# Patient Record
Sex: MALE | Race: WHITE | NOT HISPANIC OR LATINO | Employment: FULL TIME | URBAN - METROPOLITAN AREA
[De-identification: names, ages, dates, MRNs, and addresses within clinical notes are randomized per-mention and may not be internally consistent; named-entity substitution may affect disease eponyms.]

---

## 2020-08-31 ENCOUNTER — HOSPITAL ENCOUNTER (OUTPATIENT)
Facility: HOSPITAL | Age: 60
Discharge: HOME | End: 2020-09-01
Attending: EMERGENCY MEDICINE | Admitting: INTERNAL MEDICINE

## 2020-08-31 ENCOUNTER — APPOINTMENT (OUTPATIENT)
Dept: GENERAL RADIOLOGY | Facility: HOSPITAL | Age: 60
End: 2020-08-31

## 2020-08-31 DIAGNOSIS — I24.9 ACUTE CORONARY SYNDROME (HCC): Primary | ICD-10-CM

## 2020-08-31 PROBLEM — I10 HTN (HYPERTENSION): Status: ACTIVE | Noted: 2020-08-31

## 2020-08-31 PROBLEM — F41.9 ANXIETY DISORDER: Status: ACTIVE | Noted: 2020-08-31

## 2020-08-31 PROBLEM — E78.5 HLD (HYPERLIPIDEMIA): Status: ACTIVE | Noted: 2020-08-31

## 2020-08-31 LAB
ALBUMIN SERPL-MCNC: 4.3 G/DL (ref 3.5–5.2)
ALBUMIN/GLOB SERPL: 2.2 G/DL
ALP SERPL-CCNC: 67 U/L (ref 39–117)
ALT SERPL W P-5'-P-CCNC: 20 U/L (ref 1–41)
ANION GAP SERPL CALCULATED.3IONS-SCNC: 12 MMOL/L (ref 5–15)
AST SERPL-CCNC: 20 U/L (ref 1–40)
BASOPHILS # BLD AUTO: 0.03 10*3/MM3 (ref 0–0.2)
BASOPHILS NFR BLD AUTO: 0.4 % (ref 0–1.5)
BILIRUB SERPL-MCNC: 1 MG/DL (ref 0–1.2)
BILIRUB UR QL STRIP: NEGATIVE
BUN SERPL-MCNC: 20 MG/DL (ref 6–20)
BUN/CREAT SERPL: 17.4 (ref 7–25)
CALCIUM SPEC-SCNC: 9.2 MG/DL (ref 8.6–10.5)
CHLORIDE SERPL-SCNC: 107 MMOL/L (ref 98–107)
CLARITY UR: CLEAR
CO2 SERPL-SCNC: 24 MMOL/L (ref 22–29)
COLOR UR: YELLOW
CREAT SERPL-MCNC: 1.15 MG/DL (ref 0.76–1.27)
DEPRECATED RDW RBC AUTO: 40.4 FL (ref 37–54)
EOSINOPHIL # BLD AUTO: 0.08 10*3/MM3 (ref 0–0.4)
EOSINOPHIL NFR BLD AUTO: 1 % (ref 0.3–6.2)
ERYTHROCYTE [DISTWIDTH] IN BLOOD BY AUTOMATED COUNT: 12 % (ref 12.3–15.4)
GFR SERPL CREATININE-BSD FRML MDRD: 65 ML/MIN/1.73
GLOBULIN UR ELPH-MCNC: 2 GM/DL
GLUCOSE SERPL-MCNC: 184 MG/DL (ref 65–99)
GLUCOSE UR STRIP-MCNC: NEGATIVE MG/DL
HCT VFR BLD AUTO: 43.1 % (ref 37.5–51)
HGB BLD-MCNC: 14.6 G/DL (ref 13–17.7)
HGB UR QL STRIP.AUTO: NEGATIVE
HOLD SPECIMEN: NORMAL
HOLD SPECIMEN: NORMAL
IMM GRANULOCYTES # BLD AUTO: 0.03 10*3/MM3 (ref 0–0.05)
IMM GRANULOCYTES NFR BLD AUTO: 0.4 % (ref 0–0.5)
KETONES UR QL STRIP: NEGATIVE
LEUKOCYTE ESTERASE UR QL STRIP.AUTO: NEGATIVE
LIPASE SERPL-CCNC: 33 U/L (ref 13–60)
LYMPHOCYTES # BLD AUTO: 1.62 10*3/MM3 (ref 0.7–3.1)
LYMPHOCYTES NFR BLD AUTO: 19.3 % (ref 19.6–45.3)
MCH RBC QN AUTO: 31.1 PG (ref 26.6–33)
MCHC RBC AUTO-ENTMCNC: 33.9 G/DL (ref 31.5–35.7)
MCV RBC AUTO: 91.7 FL (ref 79–97)
MONOCYTES # BLD AUTO: 0.5 10*3/MM3 (ref 0.1–0.9)
MONOCYTES NFR BLD AUTO: 6 % (ref 5–12)
NEUTROPHILS NFR BLD AUTO: 6.12 10*3/MM3 (ref 1.7–7)
NEUTROPHILS NFR BLD AUTO: 72.9 % (ref 42.7–76)
NITRITE UR QL STRIP: NEGATIVE
NRBC BLD AUTO-RTO: 0 /100 WBC (ref 0–0.2)
NT-PROBNP SERPL-MCNC: 25 PG/ML (ref 0–900)
PH UR STRIP.AUTO: <=5 [PH] (ref 5–8)
PLATELET # BLD AUTO: 146 10*3/MM3 (ref 140–450)
PMV BLD AUTO: 11.9 FL (ref 6–12)
POTASSIUM SERPL-SCNC: 3.6 MMOL/L (ref 3.5–5.2)
PROT SERPL-MCNC: 6.3 G/DL (ref 6–8.5)
PROT UR QL STRIP: NEGATIVE
RBC # BLD AUTO: 4.7 10*6/MM3 (ref 4.14–5.8)
SODIUM SERPL-SCNC: 143 MMOL/L (ref 136–145)
SP GR UR STRIP: 1.02 (ref 1–1.03)
TROPONIN T SERPL-MCNC: <0.01 NG/ML (ref 0–0.03)
TROPONIN T SERPL-MCNC: <0.01 NG/ML (ref 0–0.03)
UROBILINOGEN UR QL STRIP: NORMAL
WBC # BLD AUTO: 8.38 10*3/MM3 (ref 3.4–10.8)
WHOLE BLOOD HOLD SPECIMEN: NORMAL
WHOLE BLOOD HOLD SPECIMEN: NORMAL

## 2020-08-31 PROCEDURE — 93005 ELECTROCARDIOGRAM TRACING: CPT | Performed by: EMERGENCY MEDICINE

## 2020-08-31 PROCEDURE — 96372 THER/PROPH/DIAG INJ SC/IM: CPT

## 2020-08-31 PROCEDURE — 84484 ASSAY OF TROPONIN QUANT: CPT | Performed by: INTERNAL MEDICINE

## 2020-08-31 PROCEDURE — 25010000002 ENOXAPARIN PER 10 MG: Performed by: EMERGENCY MEDICINE

## 2020-08-31 PROCEDURE — 93005 ELECTROCARDIOGRAM TRACING: CPT | Performed by: INTERNAL MEDICINE

## 2020-08-31 PROCEDURE — 81003 URINALYSIS AUTO W/O SCOPE: CPT | Performed by: NURSE PRACTITIONER

## 2020-08-31 PROCEDURE — 93010 ELECTROCARDIOGRAM REPORT: CPT | Performed by: INTERNAL MEDICINE

## 2020-08-31 PROCEDURE — G0378 HOSPITAL OBSERVATION PER HR: HCPCS

## 2020-08-31 PROCEDURE — 71045 X-RAY EXAM CHEST 1 VIEW: CPT

## 2020-08-31 PROCEDURE — 83880 ASSAY OF NATRIURETIC PEPTIDE: CPT

## 2020-08-31 PROCEDURE — 85025 COMPLETE CBC W/AUTO DIFF WBC: CPT

## 2020-08-31 PROCEDURE — 80053 COMPREHEN METABOLIC PANEL: CPT

## 2020-08-31 PROCEDURE — 99220 PR INITIAL OBSERVATION CARE/DAY 70 MINUTES: CPT | Performed by: NURSE PRACTITIONER

## 2020-08-31 PROCEDURE — 84484 ASSAY OF TROPONIN QUANT: CPT

## 2020-08-31 PROCEDURE — 93005 ELECTROCARDIOGRAM TRACING: CPT

## 2020-08-31 PROCEDURE — 99284 EMERGENCY DEPT VISIT MOD MDM: CPT

## 2020-08-31 PROCEDURE — 83690 ASSAY OF LIPASE: CPT

## 2020-08-31 RX ORDER — ASPIRIN 81 MG/1
81 TABLET, CHEWABLE ORAL DAILY
Status: DISCONTINUED | OUTPATIENT
Start: 2020-09-01 | End: 2020-09-01 | Stop reason: HOSPADM

## 2020-08-31 RX ORDER — DIAZEPAM 5 MG/1
5 TABLET ORAL 3 TIMES DAILY
Status: DISCONTINUED | OUTPATIENT
Start: 2020-08-31 | End: 2020-09-01 | Stop reason: HOSPADM

## 2020-08-31 RX ORDER — ASCORBIC ACID 500 MG
500 TABLET ORAL DAILY
COMMUNITY

## 2020-08-31 RX ORDER — LISINOPRIL 10 MG/1
10 TABLET ORAL
COMMUNITY

## 2020-08-31 RX ORDER — CHOLECALCIFEROL (VITAMIN D3) 125 MCG
5 CAPSULE ORAL NIGHTLY PRN
Status: DISCONTINUED | OUTPATIENT
Start: 2020-08-31 | End: 2020-09-01 | Stop reason: HOSPADM

## 2020-08-31 RX ORDER — ATORVASTATIN CALCIUM 10 MG/1
10 TABLET, FILM COATED ORAL NIGHTLY
COMMUNITY

## 2020-08-31 RX ORDER — ASCORBIC ACID 500 MG
500 TABLET ORAL DAILY
Status: DISCONTINUED | OUTPATIENT
Start: 2020-09-01 | End: 2020-09-01 | Stop reason: HOSPADM

## 2020-08-31 RX ORDER — ASPIRIN 81 MG/1
324 TABLET, CHEWABLE ORAL ONCE
Status: DISCONTINUED | OUTPATIENT
Start: 2020-08-31 | End: 2020-08-31

## 2020-08-31 RX ORDER — SODIUM CHLORIDE 0.9 % (FLUSH) 0.9 %
10 SYRINGE (ML) INJECTION AS NEEDED
Status: DISCONTINUED | OUTPATIENT
Start: 2020-08-31 | End: 2020-09-01 | Stop reason: HOSPADM

## 2020-08-31 RX ORDER — ACETAMINOPHEN 650 MG/1
650 SUPPOSITORY RECTAL EVERY 4 HOURS PRN
Status: DISCONTINUED | OUTPATIENT
Start: 2020-08-31 | End: 2020-09-01 | Stop reason: HOSPADM

## 2020-08-31 RX ORDER — LISINOPRIL 10 MG/1
10 TABLET ORAL
Status: DISCONTINUED | OUTPATIENT
Start: 2020-09-01 | End: 2020-09-01 | Stop reason: HOSPADM

## 2020-08-31 RX ORDER — L.ACID,PARA/B.BIFIDUM/S.THERM 8B CELL
1 CAPSULE ORAL DAILY
Status: DISCONTINUED | OUTPATIENT
Start: 2020-09-01 | End: 2020-09-01 | Stop reason: HOSPADM

## 2020-08-31 RX ORDER — ATORVASTATIN CALCIUM 10 MG/1
10 TABLET, FILM COATED ORAL NIGHTLY
Status: DISCONTINUED | OUTPATIENT
Start: 2020-09-01 | End: 2020-09-01 | Stop reason: HOSPADM

## 2020-08-31 RX ORDER — DIAZEPAM 5 MG/1
5 TABLET ORAL 3 TIMES DAILY
COMMUNITY

## 2020-08-31 RX ORDER — ACETAMINOPHEN 325 MG/1
650 TABLET ORAL EVERY 4 HOURS PRN
Status: DISCONTINUED | OUTPATIENT
Start: 2020-08-31 | End: 2020-09-01 | Stop reason: HOSPADM

## 2020-08-31 RX ORDER — OMEGA-3S/DHA/EPA/FISH OIL/D3 300MG-1000
400 CAPSULE ORAL DAILY
COMMUNITY

## 2020-08-31 RX ORDER — OMEGA-3S/DHA/EPA/FISH OIL/D3 300MG-1000
400 CAPSULE ORAL DAILY
Status: DISCONTINUED | OUTPATIENT
Start: 2020-09-01 | End: 2020-09-01 | Stop reason: HOSPADM

## 2020-08-31 RX ORDER — ONDANSETRON 2 MG/ML
4 INJECTION INTRAMUSCULAR; INTRAVENOUS EVERY 6 HOURS PRN
Status: DISCONTINUED | OUTPATIENT
Start: 2020-08-31 | End: 2020-09-01 | Stop reason: HOSPADM

## 2020-08-31 RX ORDER — ASPIRIN 81 MG/1
81 TABLET, CHEWABLE ORAL DAILY
COMMUNITY

## 2020-08-31 RX ORDER — ACETAMINOPHEN 160 MG/5ML
650 SOLUTION ORAL EVERY 4 HOURS PRN
Status: DISCONTINUED | OUTPATIENT
Start: 2020-08-31 | End: 2020-09-01 | Stop reason: HOSPADM

## 2020-08-31 RX ORDER — SODIUM CHLORIDE 0.9 % (FLUSH) 0.9 %
10 SYRINGE (ML) INJECTION EVERY 12 HOURS SCHEDULED
Status: DISCONTINUED | OUTPATIENT
Start: 2020-08-31 | End: 2020-09-01 | Stop reason: HOSPADM

## 2020-08-31 RX ORDER — PHENAZOPYRIDINE HYDROCHLORIDE 95 MG/1
TABLET ORAL
COMMUNITY
End: 2020-08-31

## 2020-08-31 RX ORDER — ONDANSETRON 4 MG/1
4 TABLET, FILM COATED ORAL EVERY 6 HOURS PRN
Status: DISCONTINUED | OUTPATIENT
Start: 2020-08-31 | End: 2020-09-01 | Stop reason: HOSPADM

## 2020-08-31 RX ADMIN — ENOXAPARIN SODIUM 80 MG: 80 INJECTION SUBCUTANEOUS at 20:48

## 2020-08-31 RX ADMIN — SODIUM CHLORIDE, PRESERVATIVE FREE 10 ML: 5 INJECTION INTRAVENOUS at 22:51

## 2020-08-31 RX ADMIN — DIAZEPAM 5 MG: 5 TABLET ORAL at 22:49

## 2020-09-01 VITALS
HEIGHT: 70 IN | OXYGEN SATURATION: 97 % | WEIGHT: 180 LBS | TEMPERATURE: 98.3 F | BODY MASS INDEX: 25.77 KG/M2 | DIASTOLIC BLOOD PRESSURE: 78 MMHG | HEART RATE: 76 BPM | RESPIRATION RATE: 16 BRPM | SYSTOLIC BLOOD PRESSURE: 112 MMHG

## 2020-09-01 LAB
ANION GAP SERPL CALCULATED.3IONS-SCNC: 8 MMOL/L (ref 5–15)
BASOPHILS # BLD AUTO: 0.02 10*3/MM3 (ref 0–0.2)
BASOPHILS NFR BLD AUTO: 0.3 % (ref 0–1.5)
BUN SERPL-MCNC: 18 MG/DL (ref 6–20)
BUN/CREAT SERPL: 21.2 (ref 7–25)
CALCIUM SPEC-SCNC: 8.9 MG/DL (ref 8.6–10.5)
CHLORIDE SERPL-SCNC: 107 MMOL/L (ref 98–107)
CHOLEST SERPL-MCNC: 110 MG/DL (ref 0–200)
CO2 SERPL-SCNC: 27 MMOL/L (ref 22–29)
CREAT SERPL-MCNC: 0.85 MG/DL (ref 0.76–1.27)
DEPRECATED RDW RBC AUTO: 41.1 FL (ref 37–54)
EOSINOPHIL # BLD AUTO: 0.06 10*3/MM3 (ref 0–0.4)
EOSINOPHIL NFR BLD AUTO: 0.8 % (ref 0.3–6.2)
ERYTHROCYTE [DISTWIDTH] IN BLOOD BY AUTOMATED COUNT: 12.1 % (ref 12.3–15.4)
GFR SERPL CREATININE-BSD FRML MDRD: 93 ML/MIN/1.73
GLUCOSE SERPL-MCNC: 100 MG/DL (ref 65–99)
HBA1C MFR BLD: 5.5 % (ref 4.8–5.6)
HCT VFR BLD AUTO: 41.9 % (ref 37.5–51)
HDLC SERPL-MCNC: 36 MG/DL (ref 40–60)
HGB BLD-MCNC: 14 G/DL (ref 13–17.7)
IMM GRANULOCYTES # BLD AUTO: 0.01 10*3/MM3 (ref 0–0.05)
IMM GRANULOCYTES NFR BLD AUTO: 0.1 % (ref 0–0.5)
LDLC SERPL CALC-MCNC: 55 MG/DL (ref 0–100)
LDLC/HDLC SERPL: 1.52 {RATIO}
LYMPHOCYTES # BLD AUTO: 1.99 10*3/MM3 (ref 0.7–3.1)
LYMPHOCYTES NFR BLD AUTO: 28.2 % (ref 19.6–45.3)
MAGNESIUM SERPL-MCNC: 2 MG/DL (ref 1.6–2.6)
MCH RBC QN AUTO: 30.9 PG (ref 26.6–33)
MCHC RBC AUTO-ENTMCNC: 33.4 G/DL (ref 31.5–35.7)
MCV RBC AUTO: 92.5 FL (ref 79–97)
MONOCYTES # BLD AUTO: 0.63 10*3/MM3 (ref 0.1–0.9)
MONOCYTES NFR BLD AUTO: 8.9 % (ref 5–12)
NEUTROPHILS NFR BLD AUTO: 4.35 10*3/MM3 (ref 1.7–7)
NEUTROPHILS NFR BLD AUTO: 61.7 % (ref 42.7–76)
NRBC BLD AUTO-RTO: 0 /100 WBC (ref 0–0.2)
PLATELET # BLD AUTO: 136 10*3/MM3 (ref 140–450)
PMV BLD AUTO: 12.2 FL (ref 6–12)
POTASSIUM SERPL-SCNC: 3.4 MMOL/L (ref 3.5–5.2)
RBC # BLD AUTO: 4.53 10*6/MM3 (ref 4.14–5.8)
SARS-COV-2 RDRP RESP QL NAA+PROBE: NOT DETECTED
SODIUM SERPL-SCNC: 142 MMOL/L (ref 136–145)
TRIGL SERPL-MCNC: 97 MG/DL (ref 0–150)
TROPONIN T SERPL-MCNC: <0.01 NG/ML (ref 0–0.03)
TROPONIN T SERPL-MCNC: <0.01 NG/ML (ref 0–0.03)
TSH SERPL DL<=0.05 MIU/L-ACNC: 2.28 UIU/ML (ref 0.27–4.2)
VLDLC SERPL-MCNC: 19.4 MG/DL
WBC # BLD AUTO: 7.06 10*3/MM3 (ref 3.4–10.8)

## 2020-09-01 PROCEDURE — 25010000002 FENTANYL CITRATE (PF) 100 MCG/2ML SOLUTION: Performed by: INTERNAL MEDICINE

## 2020-09-01 PROCEDURE — 25010000002 HEPARIN (PORCINE) PER 1000 UNITS: Performed by: INTERNAL MEDICINE

## 2020-09-01 PROCEDURE — 83735 ASSAY OF MAGNESIUM: CPT | Performed by: INTERNAL MEDICINE

## 2020-09-01 PROCEDURE — G0378 HOSPITAL OBSERVATION PER HR: HCPCS

## 2020-09-01 PROCEDURE — 80048 BASIC METABOLIC PNL TOTAL CA: CPT | Performed by: INTERNAL MEDICINE

## 2020-09-01 PROCEDURE — 87635 SARS-COV-2 COVID-19 AMP PRB: CPT | Performed by: NURSE PRACTITIONER

## 2020-09-01 PROCEDURE — 83036 HEMOGLOBIN GLYCOSYLATED A1C: CPT | Performed by: INTERNAL MEDICINE

## 2020-09-01 PROCEDURE — 0 IOPAMIDOL PER 1 ML: Performed by: INTERNAL MEDICINE

## 2020-09-01 PROCEDURE — 93005 ELECTROCARDIOGRAM TRACING: CPT | Performed by: INTERNAL MEDICINE

## 2020-09-01 PROCEDURE — 84484 ASSAY OF TROPONIN QUANT: CPT | Performed by: INTERNAL MEDICINE

## 2020-09-01 PROCEDURE — 85025 COMPLETE CBC W/AUTO DIFF WBC: CPT | Performed by: INTERNAL MEDICINE

## 2020-09-01 PROCEDURE — C1894 INTRO/SHEATH, NON-LASER: HCPCS | Performed by: INTERNAL MEDICINE

## 2020-09-01 PROCEDURE — 93458 L HRT ARTERY/VENTRICLE ANGIO: CPT | Performed by: INTERNAL MEDICINE

## 2020-09-01 PROCEDURE — 80061 LIPID PANEL: CPT | Performed by: INTERNAL MEDICINE

## 2020-09-01 PROCEDURE — 99217 PR OBSERVATION CARE DISCHARGE MANAGEMENT: CPT | Performed by: INTERNAL MEDICINE

## 2020-09-01 PROCEDURE — 25010000003 LIDOCAINE 1 % SOLUTION: Performed by: INTERNAL MEDICINE

## 2020-09-01 PROCEDURE — 84443 ASSAY THYROID STIM HORMONE: CPT | Performed by: INTERNAL MEDICINE

## 2020-09-01 PROCEDURE — C1769 GUIDE WIRE: HCPCS | Performed by: INTERNAL MEDICINE

## 2020-09-01 PROCEDURE — 25010000002 MIDAZOLAM PER 1 MG: Performed by: INTERNAL MEDICINE

## 2020-09-01 RX ORDER — LIDOCAINE HYDROCHLORIDE 10 MG/ML
INJECTION, SOLUTION INFILTRATION; PERINEURAL AS NEEDED
Status: DISCONTINUED | OUTPATIENT
Start: 2020-09-01 | End: 2020-09-01 | Stop reason: HOSPADM

## 2020-09-01 RX ORDER — SODIUM CHLORIDE 9 MG/ML
250 INJECTION, SOLUTION INTRAVENOUS CONTINUOUS
Status: ACTIVE | OUTPATIENT
Start: 2020-09-01 | End: 2020-09-01

## 2020-09-01 RX ORDER — ALPRAZOLAM 0.25 MG/1
0.25 TABLET ORAL 3 TIMES DAILY PRN
Status: DISCONTINUED | OUTPATIENT
Start: 2020-09-01 | End: 2020-09-01 | Stop reason: HOSPADM

## 2020-09-01 RX ORDER — POTASSIUM CHLORIDE 750 MG/1
40 CAPSULE, EXTENDED RELEASE ORAL AS NEEDED
Status: DISCONTINUED | OUTPATIENT
Start: 2020-09-01 | End: 2020-09-01 | Stop reason: HOSPADM

## 2020-09-01 RX ORDER — MIDAZOLAM HYDROCHLORIDE 1 MG/ML
INJECTION INTRAMUSCULAR; INTRAVENOUS AS NEEDED
Status: DISCONTINUED | OUTPATIENT
Start: 2020-09-01 | End: 2020-09-01 | Stop reason: HOSPADM

## 2020-09-01 RX ORDER — ACETAMINOPHEN 325 MG/1
650 TABLET ORAL EVERY 4 HOURS PRN
Status: DISCONTINUED | OUTPATIENT
Start: 2020-09-01 | End: 2020-09-01 | Stop reason: HOSPADM

## 2020-09-01 RX ORDER — TEMAZEPAM 7.5 MG/1
7.5 CAPSULE ORAL NIGHTLY PRN
Status: DISCONTINUED | OUTPATIENT
Start: 2020-09-01 | End: 2020-09-01 | Stop reason: HOSPADM

## 2020-09-01 RX ORDER — POTASSIUM CHLORIDE 1.5 G/1.77G
40 POWDER, FOR SOLUTION ORAL AS NEEDED
Status: DISCONTINUED | OUTPATIENT
Start: 2020-09-01 | End: 2020-09-01 | Stop reason: HOSPADM

## 2020-09-01 RX ORDER — FENTANYL CITRATE 50 UG/ML
INJECTION, SOLUTION INTRAMUSCULAR; INTRAVENOUS AS NEEDED
Status: DISCONTINUED | OUTPATIENT
Start: 2020-09-01 | End: 2020-09-01 | Stop reason: HOSPADM

## 2020-09-01 RX ORDER — HYDROCODONE BITARTRATE AND ACETAMINOPHEN 5; 325 MG/1; MG/1
1 TABLET ORAL EVERY 4 HOURS PRN
Status: DISCONTINUED | OUTPATIENT
Start: 2020-09-01 | End: 2020-09-01 | Stop reason: HOSPADM

## 2020-09-01 RX ORDER — POTASSIUM CHLORIDE 7.45 MG/ML
10 INJECTION INTRAVENOUS
Status: DISCONTINUED | OUTPATIENT
Start: 2020-09-01 | End: 2020-09-01 | Stop reason: HOSPADM

## 2020-09-01 RX ADMIN — SODIUM CHLORIDE, PRESERVATIVE FREE 10 ML: 5 INJECTION INTRAVENOUS at 08:19

## 2020-09-01 RX ADMIN — CHOLECALCIFEROL (VITAMIN D3) 10 MCG (400 UNIT) TABLET 400 UNITS: at 08:15

## 2020-09-01 RX ADMIN — ATORVASTATIN CALCIUM 10 MG: 10 TABLET, FILM COATED ORAL at 08:15

## 2020-09-01 RX ADMIN — OXYCODONE HYDROCHLORIDE AND ACETAMINOPHEN 500 MG: 500 TABLET ORAL at 08:14

## 2020-09-01 RX ADMIN — DIAZEPAM 5 MG: 5 TABLET ORAL at 08:14

## 2020-09-01 RX ADMIN — ASPIRIN 81 MG CHEWABLE TABLET 81 MG: 81 TABLET CHEWABLE at 08:15

## 2020-09-01 RX ADMIN — LISINOPRIL 10 MG: 10 TABLET ORAL at 08:16

## 2020-09-01 RX ADMIN — PSYLLIUM HUSK 1 PACKET: 3.4 POWDER ORAL at 08:16

## 2020-09-01 RX ADMIN — Medication 1 CAPSULE: at 08:15

## 2020-09-01 RX ADMIN — POTASSIUM CHLORIDE 40 MEQ: 10 CAPSULE, COATED, EXTENDED RELEASE ORAL at 08:39

## 2020-09-01 RX ADMIN — DIAZEPAM 5 MG: 5 TABLET ORAL at 16:31

## 2020-09-01 RX ADMIN — POTASSIUM CHLORIDE 40 MEQ: 10 CAPSULE, COATED, EXTENDED RELEASE ORAL at 13:16

## 2020-09-01 NOTE — DISCHARGE SUMMARY
[facility] Services  Clinical Decision Unit  DISCHARGE SUMMARY    Patient Name: Rolando Frias  : 1960  MRN: [id number]    Admission Date and Time: 2020  7:26 PM  Discharge Date and Time:  20    Primary Care Physician: Wilman Jin MD    Hospital Course     Active Hospital Problems    Diagnosis  POA   • Acute coronary syndrome (CMS/HCC) [I24.9]  Yes   • HTN (hypertension) [I10]  Yes   • HLD (hyperlipidemia) [E78.5]  Yes   • Anxiety disorder [F41.9]  Yes      Resolved Hospital Problems   No resolved problems to display.          Brief CDU Course:  Rolando Frias is a 60 y.o. male with PMH of HTN, HLD, anxiety who was brought to the ED due to onset of chest pain while playing tennis with Dr. Martinez.     Plan:     Unstable Angina  --EKG unremarkable with no ST changes, troponins negative x 3  --s/p LMWH load, continue ASA/statin  --LHC was unremarkable with no coronary artery disease noted,  preprocedure COVID NEGATIVE  --LDL at goal at 55     HTN  --continue ACEI     HLD  --LDL at goal at 55  --continue statin      Key Discharge Recommendations:  1. Follow up with PCP within one week   Discharge Evaluation     Vital Signs:   Temp:  [97.8 °F (36.6 °C)-98.3 °F (36.8 °C)] 98.3 °F (36.8 °C)  Heart Rate:  [] 69  Resp:  [16-18] 16  BP: (108-167)/(74-89) 108/80     Physical Exam:  Constitutional: No acute distress, awake, alert  HENT: NCAT, mucous membranes moist  Respiratory: Clear to auscultation bilaterally, respiratory effort normal   Cardiovascular: RRR, no murmurs, rubs, or gallops, palpable pedal pulses bilaterally  Gastrointestinal: Positive bowel sounds, soft, nontender, nondistended  Musculoskeletal: No bilateral ankle edema  Psychiatric: Appropriate affect, cooperative  Neurologic: Oriented x 3, strength symmetric in all extremities, Cranial Nerves grossly intact to confrontation, speech clear  Skin: No rashes    Pertinent  and/or Most  Recent Results     Results from last 7 days   Lab Units 09/01/20  0401 08/31/20  1932   WBC 10*3/mm3 7.06 8.38   HEMOGLOBIN g/dL 14.0 14.6   HEMATOCRIT % 41.9 43.1   PLATELETS 10*3/mm3 136* 146   SODIUM mmol/L 142 143   POTASSIUM mmol/L 3.4* 3.6   CHLORIDE mmol/L 107 107   CO2 mmol/L 27.0 24.0   BUN mg/dL 18 20   CREATININE mg/dL 0.85 1.15   GLUCOSE mg/dL 100* 184*   CALCIUM mg/dL 8.9 9.2     Results from last 7 days   Lab Units 08/31/20  1932   BILIRUBIN mg/dL 1.0   ALK PHOS U/L 67   ALT (SGPT) U/L 20   AST (SGOT) U/L 20     Results from last 7 days   Lab Units 09/01/20  0401   CHOLESTEROL mg/dL 110   TRIGLYCERIDES mg/dL 97   HDL CHOL mg/dL 36*     Results from last 7 days   Lab Units 09/01/20  1113 09/01/20  0401 08/31/20  2209 08/31/20  1932   TSH uIU/mL  --  2.280  --   --    HEMOGLOBIN A1C %  --  5.50  --   --    PROBNP pg/mL  --   --   --  25.0   TROPONIN T ng/mL <0.010 <0.010 <0.010 <0.010       Brief Urine Lab Results  (Last result in the past 365 days)      Color   Clarity   Blood   Leuk Est   Nitrite   Protein   CREAT   Urine HCG        08/31/20 2217 Yellow Clear Negative Negative Negative Negative               Microbiology Results Abnormal     Procedure Component Value - Date/Time    COVID PRE-OP / PRE-PROCEDURE SCREENING ORDER (NO ISOLATION) - Swab, Nasal Cavity [[id number]] Collected:  09/01/20 0243    Lab Status:  Final result Specimen:  Swab from Nasal Cavity Updated:  09/01/20 0319    Narrative:       The following orders were created for panel order COVID PRE-OP / PRE-PROCEDURE SCREENING ORDER (NO ISOLATION) - Swab, Nasal Cavity.  Procedure                               Abnormality         Status                     ---------                               -----------         ------                     COVID-Dat ABBOTT IN-HOUS...[[id number]]  Normal              Final result                 Please view results for these tests on the individual orders.    COVID-Dat ABBOTT IN-HOUSE,NP Swab (NO TRANSPORT  MEDIA) 2 HR TAT - Swab, Nasal Cavity [[id number]]  (Normal) Collected:  09/01/20 0243    Lab Status:  Final result Specimen:  Swab from Nasal Cavity Updated:  09/01/20 0319     COVID19 Not Detected    Narrative:       Fact sheet for providers: https://www.fda.gov/media/761059/download     Fact sheet for patients: https://www.fda.gov/media/821462/download          Imaging Results (All)     Procedure Component Value Units Date/Time    XR Chest 1 View [[id number]] Collected:  08/31/20 1958     Updated:  08/31/20 2000    Narrative:       CR Chest 1 Vw    INDICATION:   60-year-old emergency department patient complaining of midsternal chest pain today     COMPARISON:    None available.    FINDINGS:  Single portable AP view(s) of the chest.  The heart and mediastinal contours are normal. The lungs are clear. No pneumothorax or pleural effusion.      Impression:       No acute cardiopulmonary findings.    Signer Name: Nel Moss MD   Signed: 8/31/2020 7:58 PM   Workstation Name: [id number]    [facility]                           Plan for Follow-up of Pending Labs/Results: *    Discharge Medications and Follow-Up        Discharge Medications      Continue These Medications      Instructions Start Date   aspirin 81 MG chewable tablet   81 mg, Oral, Daily      atorvastatin 10 MG tablet  Commonly known as:  LIPITOR   10 mg, Oral, Nightly      cholecalciferol 10 MCG (400 UNIT) tablet  Commonly known as:  VITAMIN D3   400 Units, Oral, Daily      diazePAM 5 MG tablet  Commonly known as:  VALIUM   5 mg, Oral, 3 Times Daily      JUICE PLUS FIBRE PO   2 capsules, Oral, Daily      lisinopril 10 MG tablet  Commonly known as:  PRINIVIL,ZESTRIL   10 mg, Oral      PROBIOTIC PO   1 capsule, Oral, Daily      psyllium 58.6 % packet  Commonly known as:  METAMUCIL   1 packet, Oral, Daily      vitamin C 500 MG tablet  Commonly known as:  ASCORBIC ACID   500 mg, Oral, Daily               No future  appointments.    Additional Instructions for the Follow-ups that You Need to Schedule     Discharge Follow-up with PCP   As directed       Currently Documented PCP:    Wilman Jin MD    PCP Phone Number:    [phone]     Follow Up Details:  1 week; call your family doctor in the morning to schedule an appointment         Discharge Follow-up with Specified Provider: Dr. Martinez; as needed   As directed      To:  Dr. Martinez; as needed             Electronically signed by Kelli Lucero MD, 09/02/20, 12:56 PM.      Time Spent on Discharge: I spent  35 minutes on this discharge activity which included: face-to-face encounter with the patient, reviewing the data in the system, coordination of the care with the nursing staff as well as consultants, documentation, and entering orders.

## 2020-09-01 NOTE — ED PROVIDER NOTES
"Subjective   Pt presents with chest pain.  He was playing tennis PTA with Dr Martinez and he developed left sided chest pain, palpitations and dyspnea.  These improved with rest and he feels better now.  Dr Martinez brought him in for evaluation.    2 days ago pt had headache, cough, chest pain and dyspnea.  Those resolved without intervention.  Felt fine yesterday.  No known CAD.          Review of Systems   Constitutional: Negative for fever.   HENT: Negative for sore throat.    Respiratory: Positive for cough and shortness of breath.    Cardiovascular: Positive for chest pain.   Neurological: Positive for headaches.   All other systems reviewed and are negative.      Past Medical History:   Diagnosis Date   • Anxiety disorder 2010   • Hyperlipidemia 2016   • Hypertension 1995       No Known Allergies    Past Surgical History:   Procedure Laterality Date   • CARDIOVASCULAR STRESS TEST  2002    w/ ECHO, was told he has a \"leaky valve\"   • LEG SURGERY Left     for varicose veins       Family History   Problem Relation Age of Onset   • Diabetes Mother    • Coronary artery disease Father    • Lung cancer Father        Social History     Socioeconomic History   • Marital status:      Spouse name: Not on file   • Number of children: Not on file   • Years of education: Not on file   • Highest education level: Not on file   Tobacco Use   • Smoking status: Never Smoker   Substance and Sexual Activity   • Alcohol use: Yes     Comment: OCCASIONALLY   • Drug use: Never   • Sexual activity: Defer           Objective   Physical Exam   Constitutional: He appears well-developed and well-nourished. He is cooperative.  Non-toxic appearance. No distress.   HENT:   Head: Normocephalic and atraumatic.   Mouth/Throat: Oropharynx is clear and moist and mucous membranes are normal.   Eyes: Conjunctivae and EOM are normal. No scleral icterus.   Neck: Normal range of motion. Neck supple.   Cardiovascular: Normal rate, regular rhythm " and normal heart sounds.   No murmur heard.  Pulmonary/Chest: Effort normal and breath sounds normal. No respiratory distress. He has no wheezes. He has no rhonchi. He has no rales.   Abdominal: Soft. Bowel sounds are normal. There is no tenderness. There is no rebound and no guarding.   Musculoskeletal: Normal range of motion.   Neurological: He is alert. He has normal strength.   Skin: Skin is warm and dry. No rash noted.   Psychiatric: He has a normal mood and affect. His speech is normal and behavior is normal.   Nursing note and vitals reviewed.      Procedures           ED Course         EKG NSR.  CXR negative. Labs benign.  Martinez plans cath tomorrow.  Lovenox ordered, pt admitted.                                  MDM  Number of Diagnoses or Management Options  Acute coronary syndrome (CMS/HCC):      Amount and/or Complexity of Data Reviewed  Clinical lab tests: reviewed and ordered  Tests in the radiology section of CPT®: reviewed and ordered  Decide to obtain previous medical records or to obtain history from someone other than the patient: yes  Obtain history from someone other than the patient: yes  Discuss the patient with other providers: yes  Independent visualization of images, tracings, or specimens: yes        Final diagnoses:   Acute coronary syndrome (CMS/HCC)            Gary Hanson MD  08/31/20 3691

## 2020-09-01 NOTE — PROGRESS NOTES
[facility] Medicine Services  Clinical Decision Unit  PROGRESS NOTE    Patient Name: Rolando Frias  : 1960  MRN: [id number]    Admission Date and Time: 2020  7:26 PM  Primary Care Physician: Wilman Jin MD    Subjective   Subjective     CC:  Chest pain    HPI:  Pt doing well this am, no further CP    Review of Systems  Gen- No fevers, chills  CV- No chest pain, palpitations  Resp- No cough, dyspnea  GI- No N/V/D, abd pain        Objective   Objective     Vital Signs:   Temp:  [97.8 °F (36.6 °C)-98.3 °F (36.8 °C)] 98.3 °F (36.8 °C)  Heart Rate:  [] 56  Resp:  [16-18] 16  BP: (127-155)/(79-89) 129/79        Physical Exam:  Constitutional: No acute distress, awake, alert  HENT: NCAT, mucous membranes moist  Respiratory: Clear to auscultation bilaterally, respiratory effort normal   Cardiovascular: RRR, no murmurs, rubs, or gallops, palpable pedal pulses bilaterally  Gastrointestinal: Positive bowel sounds, soft, nontender, nondistended  Musculoskeletal: No bilateral ankle edema  Psychiatric: Appropriate affect, cooperative  Neurologic: Oriented x 3, strength symmetric in all extremities, Cranial Nerves grossly intact to confrontation, speech clear  Skin: No rashes    Results Reviewed:  WBC   Date Value Ref Range Status   2020 7.06 3.40 - 10.80 10*3/mm3 Final     RBC   Date Value Ref Range Status   2020 4.53 4.14 - 5.80 10*6/mm3 Final     Hemoglobin   Date Value Ref Range Status   2020 14.0 13.0 - 17.7 g/dL Final     Hematocrit   Date Value Ref Range Status   2020 41.9 37.5 - 51.0 % Final     MCV   Date Value Ref Range Status   2020 92.5 79.0 - 97.0 fL Final     MCH   Date Value Ref Range Status   2020 30.9 26.6 - 33.0 pg Final     MCHC   Date Value Ref Range Status   2020 33.4 31.5 - 35.7 g/dL Final     RDW   Date Value Ref Range Status   2020 12.1 (L) 12.3 - 15.4 % Final     RDW-SD   Date Value Ref Range Status      09/01/2020 41.1 37.0 - 54.0 fl Final     MPV   Date Value Ref Range Status   09/01/2020 12.2 (H) 6.0 - 12.0 fL Final     Platelets   Date Value Ref Range Status   09/01/2020 136 (L) 140 - 450 10*3/mm3 Final     Neutrophil %   Date Value Ref Range Status   09/01/2020 61.7 42.7 - 76.0 % Final     Lymphocyte %   Date Value Ref Range Status   09/01/2020 28.2 19.6 - 45.3 % Final     Monocyte %   Date Value Ref Range Status   09/01/2020 8.9 5.0 - 12.0 % Final     Eosinophil %   Date Value Ref Range Status   09/01/2020 0.8 0.3 - 6.2 % Final     Basophil %   Date Value Ref Range Status   09/01/2020 0.3 0.0 - 1.5 % Final     Immature Grans %   Date Value Ref Range Status   09/01/2020 0.1 0.0 - 0.5 % Final     Neutrophils, Absolute   Date Value Ref Range Status   09/01/2020 4.35 1.70 - 7.00 10*3/mm3 Final     Lymphocytes, Absolute   Date Value Ref Range Status   09/01/2020 1.99 0.70 - 3.10 10*3/mm3 Final     Monocytes, Absolute   Date Value Ref Range Status   09/01/2020 0.63 0.10 - 0.90 10*3/mm3 Final     Eosinophils, Absolute   Date Value Ref Range Status   09/01/2020 0.06 0.00 - 0.40 10*3/mm3 Final     Basophils, Absolute   Date Value Ref Range Status   09/01/2020 0.02 0.00 - 0.20 10*3/mm3 Final     Immature Grans, Absolute   Date Value Ref Range Status   09/01/2020 0.01 0.00 - 0.05 10*3/mm3 Final     nRBC   Date Value Ref Range Status   09/01/2020 0.0 0.0 - 0.2 /100 WBC Final     Basic Metabolic Panel    Sodium Sodium   Date Value Ref Range Status   09/01/2020 142 136 - 145 mmol/L Final   08/31/2020 143 136 - 145 mmol/L Final      Potassium Potassium   Date Value Ref Range Status   09/01/2020 3.4 (L) 3.5 - 5.2 mmol/L Final   08/31/2020 3.6 3.5 - 5.2 mmol/L Final     Comment:     Slight hemolysis detected by analyzer. Results may be affected.      Chloride Chloride   Date Value Ref Range Status   09/01/2020 107 98 - 107 mmol/L Final   08/31/2020 107 98 - 107 mmol/L Final      Bicarbonate No results found for: PLASMABICARB    BUN BUN   Date Value Ref Range Status   09/01/2020 18 6 - 20 mg/dL Final   08/31/2020 20 6 - 20 mg/dL Final      Creatinine Creatinine   Date Value Ref Range Status   09/01/2020 0.85 0.76 - 1.27 mg/dL Final   08/31/2020 1.15 0.76 - 1.27 mg/dL Final      Calcium Calcium   Date Value Ref Range Status   09/01/2020 8.9 8.6 - 10.5 mg/dL Final   08/31/2020 9.2 8.6 - 10.5 mg/dL Final      Glucose      No components found for: GLUCOSE.*         Assessment/Plan   Assessment / Plan     Active Hospital Problems    Diagnosis  POA   • Acute coronary syndrome (CMS/HCC) [I24.9]  Yes   • HTN (hypertension) [I10]  Yes   • HLD (hyperlipidemia) [E78.5]  Yes   • Anxiety disorder [F41.9]  Yes      Resolved Hospital Problems   No resolved problems to display.        Brief course to date:  Rolando Frias is a 60 y.o. male with PMH of HTN, HLD, anxiety who was brought to the ED due to onset of chest pain while playing tennis with Dr. Martinez.    Plan:    Unstable Angina  --EKG unremarkable with no ST changes, troponins negative x 3  --s/p LMWH load, continue ASA/statin  --LHC today, NPO for procedure, preprocedure COVID NEGATIVE  --LDL at goal at 55    HTN  --continue ACEI    HLD  --LDL at goal at 55  --continue statin      Discharge Blueprint:   1. Home after [facility]    Electronically signed by Kelli Lucero MD, 09/01/20, 09:18.

## 2020-09-01 NOTE — PLAN OF CARE
Problem: Patient Care Overview  Goal: Plan of Care Review  Outcome: Ongoing (interventions implemented as appropriate)  Flowsheets (Taken 9/1/2020 3422)  Plan of Care Reviewed With: patient  Note:   Pt admitted last night for chest pain he experienced while playing tennis. He has had no c/o chest pain tonight but did not sleep much.. He verbalized an understanding of his need to remain NPO for possible cardiac cath today.. His vitals have been stable but he has been bradycardic throughout the night.. Covid done was negative.. Pt had UA sent due to recent use of AZO due to urinary complaints.. Pt has denied any complaints all night ..  Goal: Individualization and Mutuality  Outcome: Ongoing (interventions implemented as appropriate)  Goal: Discharge Needs Assessment  Outcome: Ongoing (interventions implemented as appropriate)  Goal: Interprofessional Rounds/Family Conf  Outcome: Ongoing (interventions implemented as appropriate)

## 2020-09-01 NOTE — CONSULTS
Pre-Cardiac Catheterization Report  Cardiovascular Laboratory  [facility]      Patient:  Rolando Frias  :  1960  PCP:  Wilman Jin MD  PHONE:  [phone]    DATE: 2020    BRIEF HPI:  Rolando Frias is a 60 y.o. male with hypertension, hypercholesterolemia, and a family history of coronary artery disease.  He is complaining of a 4 to 5-day history of chest pain which he describes as a tightness.  He states it is moderate in severity lasting minutes with associated shortness of breath and dyspnea on exertion.  His symptoms increased with exertion and are decreased with rest.      Cardiac Risk Factors:  advanced age (older than 55 for men, 65 for women), dyslipidemia, family history of premature cardiovascular disease, hypertension, male gender    Anginal class in last 2 weeks:  CCS class III    CHF Class in last 2 weeks:  NYHA Class II    Cardiogenic shock:  no    Cardiac arrest <24 hours:  no    Stress test within last 6 months:   no   Details:    Previous cardiac catheterization:  no  Details:     Previous CABG:  no  Details:      Allergies:     IV contrast allergy:  no  No Known Allergies    MEDICATIONS:  Prior to Admission medications    Medication Sig Start Date End Date Taking? Authorizing Provider   aspirin 81 MG chewable tablet Chew 81 mg Daily.   Yes Luis Garland MD   atorvastatin (LIPITOR) 10 MG tablet Take 10 mg by mouth Every Night.   Yes Luis Garland MD   cholecalciferol (VITAMIN D3) 10 MCG (400 UNIT) tablet Take 400 Units by mouth Daily.   Yes Luis Garland MD   diazePAM (VALIUM) 5 MG tablet Take 5 mg by mouth 3 (Three) Times a Day.   Yes Luis Garland MD   lisinopril (PRINIVIL,ZESTRIL) 10 MG tablet Take 10 mg by mouth.   Yes Luis Garland MD   Nutritional Supplements (JUICE PLUS FIBRE PO) Take 2 capsules by mouth Daily.   Yes Luis Garland MD   Probiotic Product (PROBIOTIC PO) Take 1 capsule by mouth Daily.    Yes Provider, MD Luis   psyllium (METAMUCIL) 58.6 % packet Take 1 packet by mouth Daily.   Yes Luis Garland MD   vitamin C (ASCORBIC ACID) 500 MG tablet Take 500 mg by mouth Daily.   Yes Luis Garland MD       Past medical & surgical history, social and family history reviewed in the electronic medical record.    ROS:  Cardiovascular ROS: positive for - chest pain, dyspnea on exertion and shortness of breath    Physical Exam:    Vitals:   Vitals:    09/01/20 0734   BP: 129/79   Pulse: 56   Resp: 16   Temp: 98.3 °F (36.8 °C)   SpO2: 98%          08/31/20  2130   Weight: 81.6 kg (180 lb)       General Appearance:    Alert, cooperative, in no acute distress   Head:    Normocephalic, without obvious abnormality, atraumatic   Eyes:            Lids and lashes normal, conjunctivae and sclerae normal, no   icterus, no pallor, corneas clear, PERRLA   Ears:    Ears appear intact with no abnormalities noted   Neck:   No adenopathy, supple, trachea midline, no thyromegaly, no   carotid bruit, no JVD   Back:     No kyphosis present, no scoliosis present, range of motion normal   Lungs:     Clear to auscultation,respirations regular, even and                unlabored    Heart:    Regular rhythm and normal rate, normal S1 and S2, no         murmur, no gallop, no rub, no click   Chest Wall:    No abnormalities observed   Abdomen:     Normal bowel sounds, no masses, no organomegaly, soft     nontender, nondistended, no guarding, no rebound                tenderness   Rectal:     Deferred   Extremities:   Moves all extremities well, no edema, no cyanosis, no           redness   Pulses:   Pulses palpable and equal bilaterally   Skin:   No bleeding, bruising or rash   Neurologic:   Cranial nerves 2 - 12 grossly intact, sensation intact     Barbaeu Test:  Left: Normal  (oxymetric Allens) Right: Not Assessed     Results from last 7 days   Lab Units 09/01/20  0401   SODIUM mmol/L 142   POTASSIUM mmol/L 3.4*    CHLORIDE mmol/L 107   CO2 mmol/L 27.0   BUN mg/dL 18   CREATININE mg/dL 0.85   GLUCOSE mg/dL 100*   CALCIUM mg/dL 8.9     Results from last 7 days   Lab Units 09/01/20  0401   WBC 10*3/mm3 7.06   HEMOGLOBIN g/dL 14.0   HEMATOCRIT % 41.9   PLATELETS 10*3/mm3 136*     Lab Results   Component Value Date    TRIG 97 09/01/2020    HDL 36 (L) 09/01/2020    AST 20 08/31/2020    ALT 20 08/31/2020     Results from last 7 days   Lab Units 09/01/20  0401   HEMOGLOBIN A1C % 5.50       Impression      · Unstable angina    Plan     · Procedure to perform: C  · Planned access: Left radial artery              MATT Blevins  09/01/20  08:13

## 2020-09-01 NOTE — PROGRESS NOTES
Discharge Planning Assessment  [facility]     Patient Name: Rolando Frias  MRN: [id number]  Today's Date: 9/1/2020    Admit Date: 8/31/2020    Discharge Needs Assessment     Row Name 09/01/20 1109       Living Environment    Lives With  spouse    Current Living Arrangements  home/apartment/condo       Discharge Needs Assessment    Equipment Currently Used at Home  none    Equipment Needed After Discharge  none        Discharge Plan     Row Name 09/01/20 1109       Plan    Plan  H0me at DC    Patient/Family in Agreement with Plan  other (see comments)    Plan Comments  I did not disturb the pt. Info from chart. No DC needs identified at this time.    Row Name 09/01/20 0816       Plan    Final Discharge Disposition Code  01 - home or self-care        Destination      Coordination has not been started for this encounter.      Durable Medical Equipment      Coordination has not been started for this encounter.      Dialysis/Infusion      Coordination has not been started for this encounter.      Home Medical Care      Coordination has not been started for this encounter.      Therapy      Coordination has not been started for this encounter.      Community Resources      Coordination has not been started for this encounter.        Expected Discharge Date and Time     Expected Discharge Date Expected Discharge Time    Sep 1, 2020         Demographic Summary    No documentation.       Functional Status     Row Name 09/01/20 1109       Functional Status    Usual Activity Tolerance  good       Functional Status, IADL    Medications  independent    Meal Preparation  independent    Housekeeping  independent    Laundry  independent    Shopping  independent        Psychosocial    No documentation.       Abuse/Neglect    No documentation.       Legal    No documentation.       Substance Abuse    No documentation.       Patient Forms    No documentation.           Adelina Alegria RN

## 2020-09-01 NOTE — H&P
"    [facility] Medicine Services  Clinical Decision Unit (CDU)  HISTORY & PHYSICAL    Patient Name: Rolando Frias  : 1960  MRN: [id number]  Primary Care Physician: Wilman Jin MD  Date of admission: 2020  7:26 PM      Subjective   Subjective     Chief Complaint:  Chest pain    HPI:  Rolando Frias is a 60 y.o. male with a past medical history of HTN and HLD who was playing tennis with Dr. Martinez today and started having left sided chest pain and was brought to the ER. He tells me he had similar pain on Friday and went to see his PCP and his BP was elevated, he was tells me he was given PO klonopin for anxiety and his BP improved and the chest pain had not occurred again until today. He had a stress test and ECHO at age 42 and was told at that time he had a \"leaky valve\". Denies palpitations, shortness of breath, n/v. He has not had any fever and denies any known COVID-19 contacts. He will be admitted to the hospital medicine service for further evaluation and treatment.       COVID-19 RISK SCREEN     1. Has the patient had close contact without PPE with a lab confirmed COVID-19 (+) person or a person under investigation (PUI) for COVID-19 infection?  -- no    2. Has the patient had respiratory symptoms, worsened/new cough and/or SOA, unexplained fever, or sudden loss of smell and/or taste in the past 7 days? --  no    3. Does the patient have baseline higher exposure risk such as working in healthcare field, currently residing in healthcare facility, or ongoing hemodialysis?  --  no       COVID-19 Status Testing      Asymptomatic COVID testing has been performed on Rolando Frias to determine status per current protocols in preparation for planned procedure and/or discharge disposition.     The patient does not have concerning clinical findings or high risk screen for COVID and this diagnosis is not clinically suspected.  However, due to prevalence of " "asymptomatic COVID infection testing was warranted to facilitate appropriate care.        COVID result:  No results found for: COVID19             Review of Systems   Constitutional: Negative.    HENT: Negative.    Eyes: Negative.    Respiratory: Negative.    Cardiovascular: Positive for chest pain (resolved). Negative for palpitations and leg swelling.   Gastrointestinal: Negative.    Genitourinary: Positive for dysuria (took some azo this morning b/c he felt like he had a UTI).   Musculoskeletal: Negative.    Skin: Negative.    Allergic/Immunologic: Negative.    Neurological: Negative.    Hematological: Negative.    Psychiatric/Behavioral: The patient is nervous/anxious (chronic anxiety).       All other systems reviewed and negative    Personal History     Past Medical History:   Diagnosis Date   • Anxiety disorder 2010   • Hyperlipidemia 2016   • Hypertension 1995       Past Surgical History:   Procedure Laterality Date   • CARDIOVASCULAR STRESS TEST  2002    w/ ECHO, was told he has a \"leaky valve\"   • LEG SURGERY Left     for varicose veins       Family History: family history includes Coronary artery disease in his father; Diabetes in his mother; Lung cancer in his father. Otherwise pertinent FHx was reviewed and unremarkable.     Social History:  reports that he has never smoked. He does not have any smokeless tobacco history on file. He reports that he drinks alcohol. He reports that he does not use drugs.  Social History     Social History Narrative   • Not on file       Medications:  Available home medication information reviewed.  Medications Prior to Admission   Medication Sig Dispense Refill Last Dose   • aspirin 81 MG chewable tablet Chew 81 mg Daily.   8/31/2020 at 0700   • atorvastatin (LIPITOR) 10 MG tablet Take 10 mg by mouth Every Night.   8/31/2020 at 0700   • cholecalciferol (VITAMIN D3) 10 MCG (400 UNIT) tablet Take 400 Units by mouth Daily.      • diazePAM (VALIUM) 5 MG tablet Take 5 mg by " mouth 3 (Three) Times a Day.   8/31/2020 at 0700   • lisinopril (PRINIVIL,ZESTRIL) 10 MG tablet Take 10 mg by mouth.   8/31/2020 at 0700   • Nutritional Supplements (JUICE PLUS FIBRE PO) Take 2 capsules by mouth Daily.   8/30/2020 at 1000   • Probiotic Product (PROBIOTIC PO) Take 1 capsule by mouth Daily.   8/31/2020 at 1000   • psyllium (METAMUCIL) 58.6 % packet Take 1 packet by mouth Daily.      • vitamin C (ASCORBIC ACID) 500 MG tablet Take 500 mg by mouth Daily.   8/31/2020 at 1000       No Known Allergies    Objective   Objective     Vital Signs:   Temp:  [97.8 °F (36.6 °C)-98.3 °F (36.8 °C)] 97.8 °F (36.6 °C)  Heart Rate:  [] 64  Resp:  [18] 18  BP: (134-155)/(82-89) 143/82        Physical Exam   Constitutional: He is oriented to person, place, and time. He appears well-developed and well-nourished. No distress.   HENT:   Head: Normocephalic and atraumatic.   Mouth/Throat: No oropharyngeal exudate.   Eyes: Pupils are equal, round, and reactive to light. EOM are normal. No scleral icterus.   Neck: Normal range of motion. Neck supple. No JVD present.   Cardiovascular: Normal rate, regular rhythm, normal heart sounds and intact distal pulses.   No murmur heard.  Pulmonary/Chest: Effort normal and breath sounds normal. No respiratory distress. He has no wheezes. He has no rales.   Abdominal: Soft. Bowel sounds are normal.   Musculoskeletal: Normal range of motion. He exhibits no edema, tenderness or deformity.   Neurological: He is alert and oriented to person, place, and time.   Skin: Skin is warm and dry. Capillary refill takes less than 2 seconds. No rash noted. He is not diaphoretic. No erythema. No pallor.   Psychiatric: He has a normal mood and affect. His behavior is normal. Judgment and thought content normal.      Results Reviewed:    BMP/CBC: glu 184, otherwise unremarkable; troponin < 0.010, proBNP 25.0, lipase 33  EKG: SR @ 98bmp; CXR: no acute cardiopulmonary findings    Assessment/Plan      Assessment / Plan     Active Hospital Problems    Diagnosis POA   • Acute coronary syndrome (CMS/HCC) [I24.9] Yes   • HTN (hypertension) [I10] Yes   • HLD (hyperlipidemia) [E78.5] Yes   • Anxiety disorder [F41.9] Yes       Plan:    Acute Coronary Syndrome:  - admit to tele  - consult Dr. Martinez  - NPO after MN  - plan for cath in am  - preop COVID-19 screening  - Lovenox for full anticoagulation per Dr. Martinez  - aspirin  - contnue statin  - lipid panel, Hgb A1c in am  - cbc, bmp, mag in am  - trend troponin    HTN / HLD:  - continue lisinopril  - continue statin    Anxiety:  - continue valium PRN      CODE STATUS:    Code Status and Medical Interventions:   Ordered at: 08/31/20 2142     Code Status:    CPR     Medical Interventions (Level of Support Prior to Arrest):    Full       Admission Status:   I believe this patient meets OBSERVATION status, however if further evaluation or treatment plans warrant, status may change.  Based upon current information, I predict patient's care encounter to be less than or equal to 2 midnights.      Discharge Blueprint (criteria for discharge):   1. Chest pain free  2. Cleared by cardiology for d/c  3. Labs WNL    Electronically signed by ED Travis, 08/31/20, 9:01 PM.

## 2020-09-01 NOTE — PLAN OF CARE
Problem: Patient Care Overview  Goal: Plan of Care Review  Outcome: Ongoing (interventions implemented as appropriate)  Flowsheets (Taken 9/1/2020 1600 by Julio Chi, RN)  Plan of Care Reviewed With: patient;spouse  Note:   Pt being discharged  Goal: Individualization and Mutuality  Outcome: Ongoing (interventions implemented as appropriate)  Goal: Discharge Needs Assessment  Outcome: Ongoing (interventions implemented as appropriate)  Flowsheets  Taken 9/1/2020 1109 by Adelina Alegria RN  Equipment Needed After Discharge: none  Equipment Currently Used at Home: none  Taken 8/31/2020 2136 by Shreya Soares, RN  Transportation Anticipated: family or friend will provide  Patient/Family Anticipated Services at Transition: none  Patient/Family Anticipates Transition to: home with family  Goal: Interprofessional Rounds/Family Conf  Outcome: Ongoing (interventions implemented as appropriate)

## 2020-09-02 ENCOUNTER — DOCUMENTATION (OUTPATIENT)
Dept: CARDIAC REHAB | Facility: HOSPITAL | Age: 60
End: 2020-09-02

## (undated) DEVICE — CATH DIAG EXPO .045 FL3  5F 100CM

## (undated) DEVICE — CATH DIAG EXPO M/ PK 5F FL4/FR4 PIG

## (undated) DEVICE — PK CATH CARD 10

## (undated) DEVICE — SKIN PREP TRAY W/CHG: Brand: MEDLINE INDUSTRIES, INC.

## (undated) DEVICE — MODEL BT2000 P/N 700287-012KIT CONTENTS: MANIFOLD WITH SALINE AND CONTRAST PORTS, SALINE TUBING WITH SPIKE AND HAND SYRINGE, TRANSDUCER: Brand: BT2000 AUTOMATED MANIFOLD KIT

## (undated) DEVICE — DEV COMP RAD PRELUDESYNC 24CM

## (undated) DEVICE — MODEL AT P65, P/N 701554-001KIT CONTENTS: HAND CONTROLLER, 3-WAY HIGH-PRESSURE STOPCOCK WITH ROTATING END AND PREMIUM HIGH-PRESSURE TUBING: Brand: ANGIOTOUCH® KIT

## (undated) DEVICE — INTRO SHEATH PRELUDE IDEAL SPRNG COIL 021 6F 23X80CM

## (undated) DEVICE — GW INQWIRE FC PTFE STD J/1.5 .035 260